# Patient Record
(demographics unavailable — no encounter records)

---

## 2024-10-17 NOTE — REVIEW OF SYSTEMS
[Negative] : Heme/Lymph [Fever] : no fever [Chills] : no chills [Fatigue] : no fatigue [FreeTextEntry2] : sleepy

## 2024-10-17 NOTE — HEALTH RISK ASSESSMENT
[Good] : ~his/her~  mood as  good [Yes] : Yes [2 - 4 times a month (2 pts)] : 2-4 times a month (2 points) [1 or 2 (0 pts)] : 1 or 2 (0 points) [Never (0 pts)] : Never (0 points) [No] : In the past 12 months have you used drugs other than those required for medical reasons? No [No falls in past year] : Patient reported no falls in the past year [0] : 2) Feeling down, depressed, or hopeless: Not at all (0) [PHQ-2 Negative - No further assessment needed] : PHQ-2 Negative - No further assessment needed [NO] : No [HIV Test offered] : HIV Test offered [Hepatitis C test offered] : Hepatitis C test offered [With Family] : lives with family [Employed] : employed [High School] : high school [Single] : single [# Of Children ___] : has [unfilled] children [Feels Safe at Home] : Feels safe at home [Fully functional (bathing, dressing, toileting, transferring, walking, feeding)] : Fully functional (bathing, dressing, toileting, transferring, walking, feeding) [Fully functional (using the telephone, shopping, preparing meals, housekeeping, doing laundry, using] : Fully functional and needs no help or supervision to perform IADLs (using the telephone, shopping, preparing meals, housekeeping, doing laundry, using transportation, managing medications and managing finances) [Smoke Detector] : smoke detector [Carbon Monoxide Detector] : carbon monoxide detector [Seat Belt] :  uses seat belt [Sunscreen] : uses sunscreen [With Patient/Caregiver] : , with patient/caregiver [Aggressive treatment] : aggressive treatment [FreeTextEntry1] : fatigue and sleepy  [de-identified] : No [de-identified] : none [Audit-CScore] : 1 [de-identified] : walking  [de-identified] : regular  [[id number]] : 0 [de-identified] : vapping  [Change in mental status noted] : No change in mental status noted [MammogramDate] : NA [PapSmearDate] : NA [BoneDensityDate] : NA [ColonoscopyDate] : NA [de-identified] : dog care  [AdvancecareDate] : 10/24

## 2024-10-17 NOTE — PHYSICAL EXAM
[No Edema] : there was no peripheral edema [Normal Appearance] : normal in appearance [No Masses] : no palpable masses [No Nipple Discharge] : no nipple discharge [No Axillary Lymphadenopathy] : no axillary lymphadenopathy [Alert and Oriented x3] : oriented to person, place, and time [Normal] : affect was normal and insight and judgment were intact

## 2024-10-17 NOTE — HISTORY OF PRESENT ILLNESS
[FreeTextEntry1] : cc:  physical, sleepy ,tired  [de-identified] : Patient presented for physical,  She is doing well, no CP,no SOB,no Abd pain P tc/o feeling tired, sleeps a lot. P t with HX of Anemia, s/o IV Fe 2 yrs ago, have not f/u since ,

## 2025-02-05 NOTE — REASON FOR VISIT
[Follow-Up] : a follow-up evaluation of [Acute] : an acute visit for [Vulvar/Vaginal Complaint] : vulvar/vaginal complaint [FreeTextEntry2] : heavy menses

## 2025-02-05 NOTE — DISCUSSION/SUMMARY
[FreeTextEntry1] : Discussed with patient treatment options for heavy bleeding.  Patient will messi sonogram and blood work.   Pt opting for BCP and NSAID. Risks, benefits, side effects, usage, and alternatives discussed. The patient denies history of blood clot/hypertension/CVA/migraine with aura/breast cancer/liver disease/gallbladder disease/family history of first degree relative with DVT/CVA. Reviewed the option of continuous CHC and that breakthrough bleeding may occur with a continuous regimen. I spent the time noted on the day of this patient encounter preparing for, providing and documenting the above service. I have counseled and educated the patient on the differential, workup, disease course, and treatment/management plan. Education was provided to the patient during this encounter. All questions and concerns were answered and addressed in detail.

## 2025-02-05 NOTE — COUNSELING
[Nutrition/ Exercise/ Weight Management] : nutrition, exercise, weight management [Vitamins/Supplements] : vitamins/supplements [Bladder Hygiene] : bladder hygiene [HPV Vaccine] : HPV Vaccine [Medication Management] : medication management

## 2025-02-05 NOTE — PHYSICAL EXAM
[Chaperone Declined] : Patient declined chaperone [Soft] : soft [Non-tender] : non-tender [Non-distended] : non-distended [Oriented x3] : oriented x3 [Labia Majora] : normal [Labia Minora] : normal [Discharge] : a  ~M vaginal discharge was present [No Bleeding] : There was no active vaginal bleeding [Normal] : normal [Uterine Adnexae] : normal

## 2025-02-05 NOTE — HISTORY OF PRESENT ILLNESS
[FreeTextEntry1] : 26 yo  presents for  evaluation of vulvovaginal itching and discharge.  H/o of  heavy menses with anemia requiring iron infusion.. Pt did not schedule pelvic US that was ordered 9/24 for evaluation of heavy menses.

## 2025-02-21 NOTE — ASSESSMENT
[FreeTextEntry1] : Ms. REGAN 's questions were answered to her satisfaction.  She  expressed her  understanding and willingness to comply with the above recommendations, and  will return to the office in 6-month.

## 2025-02-21 NOTE — HISTORY OF PRESENT ILLNESS
[de-identified] : 25F, -American, with PMHx of iron deficiency anemia , returning for hematologic follow up. [FreeTextEntry1] : s/p parenteral iron supplementation\par   [de-identified] : Patient examined in the treatment room today.  Since the last visit 2-1/2 years ago, there has been no changes in physical examination.  Patient states that she has been busy at work and cannot follow-up in the office.  Reports intermittent symptoms of fatigue, but overall she is clinically stable.  Denies recent hospitalization, need for transfusions or growth factor support.  Medication list unchanged.  Recent blood work consistent with low hemoglobin at 9.3 g/dL, mild neutropenia.  She has recently followed up with GYN for evaluation of vaginal itching and discharge.  Prescribed hydroxyprogesterone, Naprosyn and iron sulfate (Blisovi).  She is currently receiving parenteral iron supplementation with Venofer.

## 2025-06-04 NOTE — COUNSELING
[Nutrition/ Exercise/ Weight Management] : nutrition, exercise, weight management [Vitamins/Supplements] : vitamins/supplements [Bladder Hygiene] : bladder hygiene [Medication Management] : medication management

## 2025-06-04 NOTE — REASON FOR VISIT
[Acute] : an acute visit for [Abnormal Uterine Bleeding] : abnormal uterine bleeding [Vulvar/Vaginal Complaint] : vulvar/vaginal complaint

## 2025-06-04 NOTE — HISTORY OF PRESENT ILLNESS
[No] : Patient does not have concerns regarding sex [Currently Active] : currently active [Women] : women [FreeTextEntry1] : 24 yo presents cc of foul smelling vaginal discharge and irregular vaginal spotting for few days.  Pt h/o of anemia and heavy menses decline BCP or hormonal treatments.

## 2025-06-04 NOTE — PHYSICAL EXAM
[Appropriately responsive] : appropriately responsive [No Lymphadenopathy] : no lymphadenopathy [Soft] : soft [Non-tender] : non-tender [Non-distended] : non-distended [Oriented x3] : oriented x3 [Labia Majora] : normal [Labia Minora] : normal [Discharge] : a  ~M vaginal discharge was present [Scant] : There was scant vaginal bleeding [Normal] : normal [Uterine Adnexae] : normal